# Patient Record
Sex: FEMALE | Race: WHITE
[De-identification: names, ages, dates, MRNs, and addresses within clinical notes are randomized per-mention and may not be internally consistent; named-entity substitution may affect disease eponyms.]

---

## 2019-04-24 ENCOUNTER — HOSPITAL ENCOUNTER (EMERGENCY)
Dept: HOSPITAL 25 - UCCORT | Age: 19
Discharge: HOME | End: 2019-04-24
Payer: COMMERCIAL

## 2019-04-24 VITALS — SYSTOLIC BLOOD PRESSURE: 122 MMHG | DIASTOLIC BLOOD PRESSURE: 74 MMHG

## 2019-04-24 DIAGNOSIS — J06.9: Primary | ICD-10-CM

## 2019-04-24 DIAGNOSIS — R51: ICD-10-CM

## 2019-04-24 LAB
FLUAV RNA SPEC QL NAA+PROBE: NEGATIVE
FLUBV RNA SPEC QL NAA+PROBE: NEGATIVE

## 2019-04-24 PROCEDURE — 99201: CPT

## 2019-04-24 PROCEDURE — 84702 CHORIONIC GONADOTROPIN TEST: CPT

## 2019-04-24 PROCEDURE — G0463 HOSPITAL OUTPT CLINIC VISIT: HCPCS

## 2019-04-24 PROCEDURE — 81003 URINALYSIS AUTO W/O SCOPE: CPT

## 2019-04-24 NOTE — ED
Respiratory





- HPI Summary


HPI Summary: 





18 yr old female with the complaint of mild nasal congestion, mild cough, and 

frontal headache.  Onset yesterday.   She has a mild frontal headache.  No 

fever.  She has felt tired and feels like sleeping a lot.  She has not had 

urinary symptoms.  She has not had vaginal discharge or bleeding.  





- History of Current Complaint


Chief Complaint: UCGeneralIllness


Stated Complaint: WEAK HEADACHE


Time Seen by Provider: 04/24/19 15:01


Pain Intensity: 0





- Allergy/Home Medications


Allergies/Adverse Reactions: 


 Allergies











Allergy/AdvReac Type Severity Reaction Status Date / Time


 


No Known Allergies Allergy   Verified 04/24/19 14:58











Home Medications: 


 Home Medications





NK [No Home Medications Reported]  04/24/19 [History Confirmed 04/24/19]











PMH/Surg Hx/FS Hx/Imm Hx


Infectious Disease History: No


Infectious Disease History: 


   Denies: Traveled Outside the [place] in Last 30 Days





- Social History


Alcohol Use: None


Substance Use Type: Reports: None


Smoking Status (MU): Never Smoked Tobacco





Review of Systems


Constitutional: Negative


Positive: Nasal Discharge


Positive: Cough


Positive: Headache


All Other Systems Reviewed And Are Negative: Yes





Physical Exam


Triage Information Reviewed: Yes


Vital Signs On Initial Exam: 


 Initial Vitals











Temp Pulse Resp BP Pulse Ox


 


 98.3 F   63   16   122/74   100 


 


 04/24/19 14:52  04/24/19 14:52  04/24/19 14:52  04/24/19 14:52  04/24/19 14:52











Vital Signs Reviewed: Yes


Appearance: Positive: Well-Appearing, No Pain Distress


Skin: Positive: Warm, Skin Color Reflects Adequate Perfusion


Head/Face: Positive: Normal Head/Face Inspection


ENT: Positive: Pharynx normal, Nasal congestion, Nasal drainage, TMs normal, 

Sinus tenderness


Neck: Positive: Nontender


Respiratory/Lung Sounds: Positive: Clear to Auscultation, Breath Sounds Present


Cardiovascular: Positive: RRR.  Negative: Murmur


Abdomen Description: Negative: Distended


Musculoskeletal: Positive: Strength/ROM Intact


Neurological: Positive: Sensory/Motor Intact, Alert, Oriented to Person Place, 

Time, CN Intact II-III, Normal Gait, Speech Normal


Psychiatric: Positive: Normal





- Alonzo Coma Scale


Best Eye Response: 4 - Spontaneous


Best Motor Response: 6 - Obeys Commands


Best Verbal Response: 5 - Oriented


Coma Scale Total: 15





Diagnostics





- Vital Signs


 Vital Signs











  Temp Pulse Resp BP Pulse Ox


 


 04/24/19 14:52  98.3 F  63  16  122/74  100














- Laboratory


Lab Results: 


 Lab Results











  04/24/19 04/24/19 04/24/19 Range/Units





  15:28 15:30 15:31 


 


POC Urine Color  Yellow    


 


POC Urine Clarity  Clear    


 


POC Urine pH  7.5    (5-9)  


 


POC Ur Specif Gravity  1.010    (1.010-1.030)  


 


POC Urine Protein  Negative    (Negative)  


 


POC Ur Glucose (UA)  Negative    (Negative)  


 


POC Urine Ketones  Negative    (Negative)  


 


POC Urine Blood  Negative    (Negative)  


 


POC Urine Nitrite  Negative    (Negative)  


 


POC Urine Bilirubin  Negative    (Negative)  


 


POC Urine Urobilinogen  0.2    (Negative)  


 


POC U Leukocyte Esteras  Negative    (Negative)  


 


POC Ur Pregnancy Test    Negative  (Negative)  


 


Influenza A (Rapid)   Negative   (Negative)  


 


Influenza B (Rapid)   Negative   (Negative)  











Lab Statement: Any lab studies that have been ordered have been reviewed, and 

results considered in the medical decision making process.





Disposition





- Course


Course Of Treatment: 18 yr old with mild symptoms cough, nasal congestion 

headache.  Her HCG is neg, her urine is neg, and influenza is neg.  DC home in 

good condition





- Diagnoses


Provider Diagnoses: 


 Upper respiratory infection, Headache








Discharge





- Sign-Out/Discharge


Documenting (check all that apply): Patient Departure


All imaging exams completed and their final reports reviewed: No Studies





- Discharge Plan


Condition: Good


Disposition: HOME


Patient Education Materials:  Upper Respiratory Infection (ED), Acute Headache (

ED)


Forms:  *School Release


Referrals: 


No Primary Care Phys,NOPCP [Primary Care Provider] - 


[facility] [place] [facility] [Outside]





- Billing Disposition and Condition


Condition: GOOD


Disposition: Home

## 2019-10-28 ENCOUNTER — HOSPITAL ENCOUNTER (EMERGENCY)
Dept: HOSPITAL 25 - UCCORT | Age: 19
Discharge: HOME | End: 2019-10-28
Payer: COMMERCIAL

## 2019-10-28 VITALS — SYSTOLIC BLOOD PRESSURE: 109 MMHG | DIASTOLIC BLOOD PRESSURE: 53 MMHG

## 2019-10-28 DIAGNOSIS — J02.0: Primary | ICD-10-CM

## 2019-10-28 PROCEDURE — 99212 OFFICE O/P EST SF 10 MIN: CPT

## 2019-10-28 PROCEDURE — G0463 HOSPITAL OUTPT CLINIC VISIT: HCPCS

## 2019-10-28 PROCEDURE — 87651 STREP A DNA AMP PROBE: CPT

## 2019-10-28 NOTE — ED
Throat Pain/Nasal Congestion





- HPI Summary


HPI Summary: 





19 yr old female with the complaint of sore throat.  Onset of symptoms two days 

ago.  She has a friend who has strep throat.  She has had mild upset stomach 

and headache.  No drooling and no stridor.  No other complaints. 





- History of Current Complaint


Chief Complaint: UCRespiratory


Time Seen by Provider: 10/28/19 10:41





- Allergies/Home Medications


Allergies/Adverse Reactions: 


 Allergies











Allergy/AdvReac Type Severity Reaction Status Date / Time


 


No Known Allergies Allergy   Verified 10/28/19 10:37














PMH/Surg Hx/FS Hx/Imm Hx


Previously Healthy: Yes


Infectious Disease History: No


Infectious Disease History: 


   Denies: Traveled Outside the [place] in Last 30 Days





- Family History


Known Family History: Positive: None





- Social History


Occupation: Student


Alcohol Use: None


Substance Use Type: Reports: None


Smoking Status (MU): Never Smoked Tobacco





Review of Systems


Constitutional: Negative


Positive: Sore Throat


Positive: Cough


All Other Systems Reviewed And Are Negative: Yes





Physical Exam


Triage Information Reviewed: Yes


Vital Signs On Initial Exam: 


 Initial Vitals











Temp Pulse Resp BP Pulse Ox


 


 97.9 F   64   18   109/53   100 


 


 10/28/19 10:39  10/28/19 10:39  10/28/19 10:39  10/28/19 10:39  10/28/19 10:39











Vital Signs Reviewed: Yes


Appearance: Positive: Well-Appearing, No Pain Distress


Skin: Positive: Warm, Skin Color Reflects Adequate Perfusion


Head/Face: Positive: Normal Head/Face Inspection


Eyes: Positive: EOMI


ENT: Positive: Pharyngeal erythema


Neck: Positive: Nontender


Respiratory/Lung Sounds: Positive: Clear to Auscultation, Breath Sounds Present


Cardiovascular: Positive: RRR.  Negative: Murmur


Abdomen Description: Negative: Distended


Musculoskeletal: Positive: Strength/ROM Intact


Neurological: Positive: Sensory/Motor Intact, Alert, Oriented to Person Place, 

Time, CN Intact II-III


Psychiatric: Positive: Normal





Diagnostics





- Vital Signs


 Vital Signs











  Temp Pulse Resp BP Pulse Ox


 


 10/28/19 10:39  97.9 F  64  18  109/53  100














- Laboratory


Lab Results: 


 Lab Results











  10/28/19 Range/Units





  10:46 


 


Group A Strep Rapid  Positive A  (Negative)  











Lab Statement: Any lab studies that have been ordered have been reviewed, and 

results considered in the medical decision making process.





EENT Course/Dx





- Course


Course Of Treatment: 19 yr old with strep pharyngitis.  DC home on Amox.





- Diagnoses


Provider Diagnoses: 


 Strep pharyngitis








Discharge ED





- Sign-Out/Discharge


Documenting (check all that apply): Patient Departure


All imaging exams completed and their final reports reviewed: No Studies





- Discharge Plan


Condition: Good


Disposition: HOME


Prescriptions: 


Amoxicillin PO (*) [Amoxicillin 500 MG CAP*] 500 mg PO TID #30 cap


Patient Education Materials:  Strep Throat (ED)


Forms:  *School Release


Referrals: 


No Primary Care Phys,NOPCP [Primary Care Provider] - 


[facility] [place] [facility] REFERRAL [Outside] - 2 Days





- Billing Disposition and Condition


Condition: GOOD


Disposition: Home